# Patient Record
Sex: MALE | Race: WHITE | NOT HISPANIC OR LATINO | URBAN - METROPOLITAN AREA
[De-identification: names, ages, dates, MRNs, and addresses within clinical notes are randomized per-mention and may not be internally consistent; named-entity substitution may affect disease eponyms.]

---

## 2024-04-11 ENCOUNTER — EMERGENCY (EMERGENCY)
Facility: HOSPITAL | Age: 59
LOS: 1 days | Discharge: HOME | End: 2024-04-11
Attending: EMERGENCY MEDICINE | Admitting: EMERGENCY MEDICINE
Payer: SELF-PAY

## 2024-04-11 VITALS
SYSTOLIC BLOOD PRESSURE: 162 MMHG | TEMPERATURE: 98 F | HEIGHT: 71 IN | RESPIRATION RATE: 18 BRPM | WEIGHT: 315 LBS | HEART RATE: 73 BPM | OXYGEN SATURATION: 95 % | DIASTOLIC BLOOD PRESSURE: 97 MMHG

## 2024-04-11 PROCEDURE — 99284 EMERGENCY DEPT VISIT MOD MDM: CPT

## 2024-04-11 PROCEDURE — 99053 MED SERV 10PM-8AM 24 HR FAC: CPT

## 2024-04-11 RX ORDER — CYCLOBENZAPRINE HYDROCHLORIDE 10 MG/1
10 TABLET, FILM COATED ORAL ONCE
Refills: 0 | Status: COMPLETED | OUTPATIENT
Start: 2024-04-11 | End: 2024-04-11

## 2024-04-11 NOTE — ED PROVIDER NOTE - CLINICAL SUMMARY MEDICAL DECISION MAKING FREE TEXT BOX
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  Will check CT, meds, outpatient follow-up

## 2024-04-11 NOTE — ED PROVIDER NOTE - CPE EDP MUSC NORM
Pt cleared by [facility] for OOB with OT. Pt received semisupine +primafit +hep lock +NAD; pt left as found with all needs met, RN aware. normal...

## 2024-04-11 NOTE — ED PROVIDER NOTE - CARE PROVIDER_API CALL
Rogelio Muñoz [place]  Orthopaedic Surgery  [address] [place] [address]  Phone: [phone]  Fax: [phone]  Follow Up Time:     Carolina Angeles  Internal Medicine  [address] [place] [address]  Phone: [phone]  Fax: [phone]  Follow Up Time:

## 2024-04-11 NOTE — ED PROVIDER NOTE - MUSCULOSKELETAL, MLM
Spine appears normal, range of motion is not limited, pos tend bl post cervical and post scm. no edema. no muscle or joint tenderness except as noted

## 2024-04-11 NOTE — ED PROVIDER NOTE - NSFOLLOWUPINSTRUCTIONS_ED_ALL_ED_FT
1.  Follow-up with your Primary Medical Doctor or referred doctor. Call today / next business day for prompt follow-up.  Follow-up with one of the local clinics. These clinics take Medicaid, and also see patients without insurance.  [facility] : [phone]  [facility]: [phone]  [facility] [phone]  [facility] access hospital: [phone]  2.  With back pain, whether it is a new pain or a chronic pain, it is very important to have close, prompt outpatient follow-up for continued workup, evaluation and definitive diagnosis.  If you develop worsening or persistent pain, any numbness, tingling, weakness of one or both of your legs, any fever, or any changes / difficulty urinating then you will need to see your back doctor immediately or you can return to the emergency room.  Many times your doctor will need to set you up for further imaging / testing such as an MRI.  3.  See attached instruction sheets for additional information, including information regarding signs and symptoms to look out for, reasons to seek immediate care and other important instructions.  4.  Follow-up with orthopedics as soon as possible. If you do not have one then you will need to call the referred doctor as soon as possible (today / next business day) for prompt follow-up for further workup and treatment. See the referred doctor for information.  You can use the orthopedic clinic: [phone]  5.  Naproxen every 12 hours as needed, with food  6.  Flexeril as needed every 8 hours, no driving or operating machinery.  7.  Percocet as needed every 6 hours, no driving or operating machinery

## 2024-04-11 NOTE — ED PROVIDER NOTE - OBJECTIVE STATEMENT
58-year-old male with no known medical history, has not seen a physician in many years, presents with having some neck pain over the past 2 days.  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  Patient noted having pain in bilateral posterior neck, feels increased pain with movement of the neck.  No fever or chills.  No acute headache.  No nausea or vomiting.  No numbness or tingling in the arms or legs.  No pain radiating to the arms or legs.  No fall or direct trauma.  No aggravating or alleviating factors otherwise noted.  No other acute complaints.  Patient took 2 Aleve this evening

## 2024-04-11 NOTE — ED PROVIDER NOTE - CARE PROVIDERS DIRECT ADDRESSES
[email or web address]

## 2024-04-11 NOTE — ED PROVIDER NOTE - PATIENT PORTAL LINK FT
You can access the FollowMyHealth Patient Portal offered by [facility] by registering at the following website: [email or web address]. By joining [employer]’s FollowMyHealth portal, you will also be able to view your health information using other applications (apps) compatible with our system.

## 2024-04-11 NOTE — ED PROVIDER NOTE - PROGRESS NOTE DETAILS
Patient doing well, no acute complaints. Patient feeling improvement, but still having some pain.  Will give additional Percocet prior to discharge. Discussed with patient about the nature of the neck pain and the importance of outpatient follow-up for continued workup, evaluation and definitive diagnosis.  Discussed neck pain and neurologic precautions including numbness, tingling, weakness, fever, and changes in bowel/bladder.  An opportunity to ask questions was given . Understanding of all instructions and precautions was verbalized and the patient will follow-up as outpatient as soon as possible. Patient also understands the possibility of needing additional imaging, ie MRI as outpatient. Patient will return with any changes, concerns, or any worsening / persistent symptoms.

## 2024-04-12 ENCOUNTER — NON-APPOINTMENT (OUTPATIENT)
Age: 59
End: 2024-04-12

## 2024-04-12 VITALS
RESPIRATION RATE: 18 BRPM | TEMPERATURE: 98 F | HEART RATE: 71 BPM | SYSTOLIC BLOOD PRESSURE: 163 MMHG | DIASTOLIC BLOOD PRESSURE: 92 MMHG | OXYGEN SATURATION: 94 %

## 2024-04-12 PROBLEM — Z00.00 ENCOUNTER FOR PREVENTIVE HEALTH EXAMINATION: Status: ACTIVE | Noted: 2024-04-12

## 2024-04-12 PROCEDURE — 72125 CT NECK SPINE W/O DYE: CPT | Mod: MC

## 2024-04-12 PROCEDURE — 99284 EMERGENCY DEPT VISIT MOD MDM: CPT | Mod: 25

## 2024-04-12 PROCEDURE — 72125 CT NECK SPINE W/O DYE: CPT | Mod: 26,MC

## 2024-04-12 RX ORDER — OXYCODONE AND ACETAMINOPHEN 5; 325 MG/1; MG/1
1 TABLET ORAL
Qty: 15 | Refills: 0
Start: 2024-04-12

## 2024-04-12 RX ORDER — CYCLOBENZAPRINE HYDROCHLORIDE 10 MG/1
1 TABLET, FILM COATED ORAL
Qty: 10 | Refills: 0
Start: 2024-04-12

## 2024-04-12 RX ADMIN — CYCLOBENZAPRINE HYDROCHLORIDE 10 MILLIGRAM(S): 10 TABLET, FILM COATED ORAL at 00:01

## 2024-04-12 NOTE — ED ADULT NURSE NOTE - OBJECTIVE STATEMENT
Pt presenting with neck pain and stiffness, unable to move neck side to side. Denies headache, no radiation down his arms, no numbness or tingling. Meds given as per MD orders. Pt resting comfortably in bed, rails up and wheels locked in place.

## 2024-04-12 NOTE — ED ADULT NURSE NOTE - CHIEF COMPLAINT
Pyridostigmine Bromide 60 MG Oral Tablet    SIG: TAKE 1 1/2 TABLETS EACH MORNING, 1 1/2 TABLET EACH AFTERNOON AND 1 1/2 TABLETS EACH EVENING   Dispensed: Days: 30 Qty: 150 (Tablet)    Please send to her local pharmacy on file. Thank you~   The patient is a 58y Male complaining of neck pain.
